# Patient Record
Sex: MALE | Race: BLACK OR AFRICAN AMERICAN
[De-identification: names, ages, dates, MRNs, and addresses within clinical notes are randomized per-mention and may not be internally consistent; named-entity substitution may affect disease eponyms.]

---

## 2018-03-14 ENCOUNTER — HOSPITAL ENCOUNTER (EMERGENCY)
Dept: HOSPITAL 62 - ER | Age: 43
Discharge: HOME | End: 2018-03-14
Payer: COMMERCIAL

## 2018-03-14 VITALS — SYSTOLIC BLOOD PRESSURE: 153 MMHG | DIASTOLIC BLOOD PRESSURE: 107 MMHG

## 2018-03-14 DIAGNOSIS — R05: ICD-10-CM

## 2018-03-14 DIAGNOSIS — I10: Primary | ICD-10-CM

## 2018-03-14 PROCEDURE — 99283 EMERGENCY DEPT VISIT LOW MDM: CPT

## 2018-03-14 NOTE — ER DOCUMENT REPORT
ED Blood Pressure Problem





- General


Chief Complaint: Blood Pressure Problem


Stated Complaint: POSSIBLE HIGH BLOOD PRESSURE


Time Seen by Provider: 03/14/18 17:10


Notes: 





Patient states he has a little bit of cough and congestion and feeling "sluggish

".  [place].  He 

states that it was approximately 200/100.  He then called his mother who told 

him he should go to the emergency department.  He states that he does not have 

any chest pain or shortness of breath.  He states he is supposed to take blood 

pressure medicine but does not currently take it.  He denies any other 

significant symptoms.  Symptoms of been mild and intermittent.  Nothing makes 

them better or worse.  No known radiation of the symptoms.


TRAVEL OUTSIDE OF THE [place] IN LAST 30 DAYS: No





- Related Data


Allergies/Adverse Reactions: 


 





No Known Allergies Allergy (Verified 03/14/18 16:07)


 











Past Medical History





- General


Information source: Patient





- Social History


Smoking Status: Never Smoker


Chew tobacco use (# tins/day): No


Frequency of alcohol use: Occasional


Drug Abuse: None


Family History: Reviewed & Not Pertinent


Patient has suicidal ideation: No


Patient has homicidal ideation: No





- Past Medical History


Cardiac Medical History: Reports: Hx Hypertension


Renal/ Medical History: Denies: Hx Peritoneal Dialysis


Past Surgical History: Reports: Hx Orthopedic Surgery - left should





Review of Systems





- Review of Systems


Constitutional: denies: Chills, Fever


Cardiovascular: denies: Chest pain, Palpitations


Respiratory: denies: Short of breath, Wheezing





Physical Exam





- Vital signs


Vitals: 





 











Temp Pulse Resp BP Pulse Ox


 


 98.5 F   79   14   153/107 H  97 


 


 03/14/18 16:11  03/14/18 16:11  03/14/18 16:11  03/14/18 16:11  03/14/18 16:11











Interpretation: Hypertensive





- General


General appearance: Appears well, Alert


In distress: None





- HEENT


Head: Normocephalic, Atraumatic


Eyes: Normal


Pupils: PERRL





- Respiratory


Respiratory status: No respiratory distress


Chest status: Nontender


Breath sounds: Normal


Chest palpation: Normal





- Cardiovascular


Rhythm: Regular


Heart sounds: Normal auscultation


Murmur: No





- Abdominal


Inspection: Normal


Distension: No distension


Bowel sounds: Normal


Tenderness: Nontender


Organomegaly: No organomegaly





- Back


Back: Normal, Nontender





- Extremities


General upper extremity: Normal inspection, Nontender, Normal color, Normal ROM

, Normal temperature


General lower extremity: Normal inspection, Nontender, Normal color, Normal ROM

, Normal temperature, Normal weight bearing.  No: Candelario's sign





- Neurological


Neuro grossly intact: Yes


Cognition: Normal


Orientation: AAOx4


[place] Coma Scale Eye Opening: Spontaneous


[place] Coma Scale Verbal: Oriented


[place] Coma Scale Motor: Obeys Commands


[place] Coma Scale Total: 15


Speech: Normal


Motor strength normal: LUE, RUE, LLE, RLE


Sensory: Normal





- Psychological


Associated symptoms: Normal affect, Normal mood





- Skin


Skin Temperature: Warm


Skin Moisture: Dry


Skin Color: Normal





Course





- Vital Signs


Vital signs: 





 











Temp Pulse Resp BP Pulse Ox


 


 98.5 F   79   14   153/107 H  97 


 


 03/14/18 16:11  03/14/18 16:11  03/14/18 16:11  03/14/18 16:11  03/14/18 16:11














Discharge





- Discharge


Clinical Impression: 


 Uncontrolled hypertension





Condition: Stable


Disposition: HOME, SELF-CARE


Instructions:  High Blood Pressure, Requiring Treatment ([facility])


Forms:  Return to Work


Referrals: 


ERICKSON CROWLEY MD [Primary Care Provider] - Follow up as needed

## 2020-10-14 ENCOUNTER — HOSPITAL ENCOUNTER (EMERGENCY)
Dept: HOSPITAL 62 - ER | Age: 45
Discharge: HOME | End: 2020-10-14
Payer: COMMERCIAL

## 2020-10-14 VITALS — SYSTOLIC BLOOD PRESSURE: 206 MMHG | DIASTOLIC BLOOD PRESSURE: 121 MMHG

## 2020-10-14 DIAGNOSIS — Z87.891: ICD-10-CM

## 2020-10-14 DIAGNOSIS — Z79.899: ICD-10-CM

## 2020-10-14 DIAGNOSIS — M19.90: ICD-10-CM

## 2020-10-14 DIAGNOSIS — I10: ICD-10-CM

## 2020-10-14 DIAGNOSIS — M54.42: Primary | ICD-10-CM

## 2020-10-14 PROCEDURE — 99284 EMERGENCY DEPT VISIT MOD MDM: CPT

## 2020-10-14 NOTE — ER DOCUMENT REPORT
HPI





- HPI


Patient complains to provider of: Back pain


Time Seen by Provider: 10/14/20 11:23


Pain Level: 4


Notes: 





35-year-old male emergency department with complaints of low back pain is been 

ongoing for the past week with radiation of this pain into his left posterior 

buttock and thigh.  He states that this past Friday he went and saw his primary 

care physician.  He states he had an x-ray that showed arthritis.  Primary care 

physician put him on tramadol.  He states he has been taking the tramadol.  Not 

been helping.  He states that he feels like the burning pain in the back of his 

thigh is gotten worse.  He denies any recent falls.  He denies any other blunt 

trauma.  He denies any recent change in his exercise program or anything lifting

heavy weights.  He denies saddle paresthesia, bladder bowel incontinence, 

fevers, or IV drug abuse.  Of note the patient was very hypertensive here in the

emergency department.  He has no hypertension he has not taken his blood 

pressure medicines today he denies any headache, dizziness, numbness and 

tingling, extremity weakness, blurry vision, chest pain, diaphoresis, or 

shortness of breath.





- ROS


Systems Reviewed and Negative: Yes All other systems reviewed and negative





- CONSTITUTIONAL


Constitutional: DENIES: Fever, Chills





- EENT


EENT: DENIES: Sore Throat, Ear Pain





- NEURO


Neurology: DENIES: Headache, Weakness, Vision blurred, Dizzinesss / Vertigo





- CARDIOVASCULAR


Cardiovascular: DENIES: Chest pain





- RESPIRATORY


Respiratory: DENIES: Trouble Breathing, Coughing





- GASTROINTESTINAL


Gastrointestinal: DENIES: Abdominal Pain, Nausea, Patient vomiting, Diarrhea, 

Constipation


Notes: 





No bowel incontinence





- URINARY


Urinary: DENIES: Dysuria, Urgency, Frequency


Notes: 





No bladder incontinence





- MUSCULOSKELETAL


Musculoskeletal: REPORTS: Extremity pain - Low back pain that radiates do, Back 

Pain





- DERM


Skin Color: Normal


Skin Problems: None





Past Medical History





- General


Information source: Patient





- Social History


Smoking Status: Former Smoker


Chew tobacco use (# tins/day): No


Frequency of alcohol use: Social


Drug Abuse: None


Family History: Reviewed & Not Pertinent, Hypertension


Patient has homicidal ideation: No





- Past Medical History


Cardiac Medical History: Reports: Hx Hypertension


Renal/ Medical History: Denies: Hx Peritoneal Dialysis


Past Surgical History: Reports: Hx Orthopedic Surgery - left should





Vertical Provider Document





- CONSTITUTIONAL


Agree With Documented VS: Yes


General Appearance: WD/WN, No Apparent Distress





- INFECTION CONTROL


TRAVEL OUTSIDE OF THE [place] IN LAST 30 DAYS: No





- HEENT


HEENT: Atraumatic, Normocephalic, PERRLA





- NECK


Neck: Normal Inspection, Supple





- RESPIRATORY


Respiratory: Breath Sounds Normal, No Respiratory Distress.  negative: Rales, 

Rhonchi, Wheezing





- CARDIOVASCULAR


Cardiovascular: Regular Rate, Regular Rhythm, No Murmur





- GI/ABDOMEN


Gastrointestinal: Abdomen Soft, Abdomen Non-Tender, No Organomegaly





- BACK


Notes: 





There is midline tenderness to palpation to the lumbar spine.  Patient has a 

positive left straight leg raise.  He has a negative right straight leg raise.  

He has 5 out of 5 strength to bilateral lower extremities against resistance 

flexion and extension.  Nontender to palpation of the bilateral hip joints, knee

joints, ankle joints.  Pulses are intact and equal.





- MUSCULOSKELETAL/EXTREMETIES


Musculoskeletal/Extremeties: MAEW, FROM, Non-Tender





- NEURO


Level of Consciousness: Awake, Alert, Appropriate


Motor/Sensory: No Motor Deficit, No Sensory Deficit, No Pronator Drift.  

negative: Weak Motor Strength RUE, Weak Motor Strength LUE, Weak Motor Strength 

RLE, Weak Motor Strength LLE





- DERM


Integumentary: Warm, Dry, No Rash





Course





- Re-evaluation


Re-evalutation: 





Impression: Lumbar back pain with sciatica, hypertension.  Patient has not taken

his blood pressure medicine this morning.  We will go ahead and give him his 

hydrochlorothiazide and amlodipine.  He has no symptoms of headache, weakness, 

dizziness, chest pain, shortness of breath, diaphoresis, blurry vision, 

lightheadedness.  I have encouraged him to take his blood pressure medicine 

without fail that he will need to see his primary care physician again for 

further management and adjustment of the blood pressure medicine.  Will send 

home with medicine for adequate.  Will adjust pain meds medicine and give 

gabapentin.  Have given him information for follow-up with spine specialist and 

told him to call as soon as possible to get an appointment.  Encouraged him to 

return for any worsening symptoms at all to include chest pain, shortness of 

breath, syncope, dizziness, bladder or bowel incontinence, inability to walk, 

saddle paresthesias.  Patient agrees with plan








- Vital Signs


Vital signs: 


                                        











Temp Pulse Resp BP Pulse Ox


 


 98.2 F   88   20   206/121 H  96 


 


 10/14/20 10:49  10/14/20 10:49  10/14/20 10:49  10/14/20 10:49  10/14/20 10:49














Discharge





- Discharge


Clinical Impression: 


Sciatica


Qualifiers:


 Laterality: left Qualified Code(s): M54.32 - Sciatica, left side





Low back pain


Qualifiers:


 Chronicity: acute Back pain laterality: left Sciatica presence: with sciatica 

Sciatica laterality: sciatica of left side Qualified Code(s): M54.42 - Lumbago 

with sciatica, left side





Hypertension


Qualifiers:


 Hypertension type: essential hypertension Qualified Code(s): I10 - Essential 

(primary) hypertension





Condition: Stable


Disposition: HOME, SELF-CARE


Instructions:  Sciatica ([facility])


Additional Instructions: 


No heavy lifting above 10 pounds.  Follow-up with the spine specialist without 

fail.  Return if any worsening symptoms such as fevers, bladder or bowel 

incontinence, numbness and tingling around her rectum or penis.  Follow-up with 

your primary care.  Take your blood pressure medicines every single day.  Stop 

the tramadol.  Continue the prednisone.  Please call the spine specialist 

listed:





[facility] [address] [address] [phone]








Prescriptions: 


Gabapentin [Neurontin 100 mg Capsule] 100 mg PO TID #15 capsule


Oxycodone HCl/Acetaminophen [Percocet 5-325 mg Tablet] 1 tab PO Q4H PRN #15 

tablet


 PRN Reason: 


Forms:  Return to Work


Referrals: 


ABEBE HARDWICK PA [Primary Care Provider] - Follow up in 3-5 days